# Patient Record
Sex: FEMALE | Race: ASIAN | NOT HISPANIC OR LATINO | URBAN - METROPOLITAN AREA
[De-identification: names, ages, dates, MRNs, and addresses within clinical notes are randomized per-mention and may not be internally consistent; named-entity substitution may affect disease eponyms.]

---

## 2019-12-21 ENCOUNTER — EMERGENCY (EMERGENCY)
Facility: HOSPITAL | Age: 54
LOS: 1 days | Discharge: HOME | End: 2019-12-21
Attending: STUDENT IN AN ORGANIZED HEALTH CARE EDUCATION/TRAINING PROGRAM
Payer: COMMERCIAL

## 2019-12-21 VITALS
TEMPERATURE: 98 F | RESPIRATION RATE: 16 BRPM | DIASTOLIC BLOOD PRESSURE: 89 MMHG | SYSTOLIC BLOOD PRESSURE: 138 MMHG | HEART RATE: 65 BPM | OXYGEN SATURATION: 100 %

## 2019-12-21 VITALS
RESPIRATION RATE: 16 BRPM | DIASTOLIC BLOOD PRESSURE: 105 MMHG | TEMPERATURE: 98 F | HEART RATE: 69 BPM | SYSTOLIC BLOOD PRESSURE: 161 MMHG | OXYGEN SATURATION: 99 %

## 2019-12-21 LAB
ALBUMIN SERPL ELPH-MCNC: 4.8 G/DL — SIGNIFICANT CHANGE UP (ref 3.3–5)
ALP SERPL-CCNC: 85 U/L — SIGNIFICANT CHANGE UP (ref 40–120)
ALT FLD-CCNC: 44 U/L — HIGH (ref 4–33)
ANION GAP SERPL CALC-SCNC: 15 MMO/L — HIGH (ref 7–14)
AST SERPL-CCNC: 29 U/L — SIGNIFICANT CHANGE UP (ref 4–32)
BILIRUB SERPL-MCNC: 0.8 MG/DL — SIGNIFICANT CHANGE UP (ref 0.2–1.2)
BUN SERPL-MCNC: 11 MG/DL — SIGNIFICANT CHANGE UP (ref 7–23)
CALCIUM SERPL-MCNC: 9.7 MG/DL — SIGNIFICANT CHANGE UP (ref 8.4–10.5)
CHLORIDE SERPL-SCNC: 101 MMOL/L — SIGNIFICANT CHANGE UP (ref 98–107)
CO2 SERPL-SCNC: 25 MMOL/L — SIGNIFICANT CHANGE UP (ref 22–31)
CREAT SERPL-MCNC: 0.75 MG/DL — SIGNIFICANT CHANGE UP (ref 0.5–1.3)
GLUCOSE SERPL-MCNC: 125 MG/DL — HIGH (ref 70–99)
HCT VFR BLD CALC: 45.3 % — HIGH (ref 34.5–45)
HGB BLD-MCNC: 15.1 G/DL — SIGNIFICANT CHANGE UP (ref 11.5–15.5)
MCHC RBC-ENTMCNC: 29.5 PG — SIGNIFICANT CHANGE UP (ref 27–34)
MCHC RBC-ENTMCNC: 33.3 % — SIGNIFICANT CHANGE UP (ref 32–36)
MCV RBC AUTO: 88.5 FL — SIGNIFICANT CHANGE UP (ref 80–100)
NRBC # FLD: 0 K/UL — SIGNIFICANT CHANGE UP (ref 0–0)
PLATELET # BLD AUTO: 326 K/UL — SIGNIFICANT CHANGE UP (ref 150–400)
PMV BLD: 9.1 FL — SIGNIFICANT CHANGE UP (ref 7–13)
POTASSIUM SERPL-MCNC: 3.8 MMOL/L — SIGNIFICANT CHANGE UP (ref 3.5–5.3)
POTASSIUM SERPL-SCNC: 3.8 MMOL/L — SIGNIFICANT CHANGE UP (ref 3.5–5.3)
PROT SERPL-MCNC: 8.3 G/DL — SIGNIFICANT CHANGE UP (ref 6–8.3)
RBC # BLD: 5.12 M/UL — SIGNIFICANT CHANGE UP (ref 3.8–5.2)
RBC # FLD: 12.5 % — SIGNIFICANT CHANGE UP (ref 10.3–14.5)
SODIUM SERPL-SCNC: 141 MMOL/L — SIGNIFICANT CHANGE UP (ref 135–145)
WBC # BLD: 8.38 K/UL — SIGNIFICANT CHANGE UP (ref 3.8–10.5)
WBC # FLD AUTO: 8.38 K/UL — SIGNIFICANT CHANGE UP (ref 3.8–10.5)

## 2019-12-21 PROCEDURE — 99284 EMERGENCY DEPT VISIT MOD MDM: CPT

## 2019-12-21 PROCEDURE — 71046 X-RAY EXAM CHEST 2 VIEWS: CPT | Mod: 26

## 2019-12-21 RX ORDER — ONDANSETRON 8 MG/1
1 TABLET, FILM COATED ORAL
Qty: 20 | Refills: 0
Start: 2019-12-21 | End: 2019-12-30

## 2019-12-21 RX ORDER — SODIUM CHLORIDE 9 MG/ML
1000 INJECTION INTRAMUSCULAR; INTRAVENOUS; SUBCUTANEOUS ONCE
Refills: 0 | Status: COMPLETED | OUTPATIENT
Start: 2019-12-21 | End: 2019-12-21

## 2019-12-21 RX ORDER — ONDANSETRON 8 MG/1
4 TABLET, FILM COATED ORAL ONCE
Refills: 0 | Status: COMPLETED | OUTPATIENT
Start: 2019-12-21 | End: 2019-12-21

## 2019-12-21 RX ADMIN — SODIUM CHLORIDE 1000 MILLILITER(S): 9 INJECTION INTRAMUSCULAR; INTRAVENOUS; SUBCUTANEOUS at 10:33

## 2019-12-21 RX ADMIN — ONDANSETRON 4 MILLIGRAM(S): 8 TABLET, FILM COATED ORAL at 10:33

## 2019-12-21 NOTE — ED ADULT TRIAGE NOTE - CHIEF COMPLAINT QUOTE
Patient states that she has had cold symptoms x 2 weeks. She is taking amoxicillin and med for her cough. c/o feeling dizzy , vomiting, no appetite.

## 2019-12-21 NOTE — ED PROVIDER NOTE - PATIENT PORTAL LINK FT
You can access the FollowMyHealth Patient Portal offered by [facility] by registering at the following website: [email or web address]. By joining [employer]’s FollowMyHealth portal, you will also be able to view your health information using other applications (apps) compatible with our system.

## 2019-12-21 NOTE — ED ADULT NURSE NOTE - OBJECTIVE STATEMENT
AOX4, skin warm, dry and intact. c/o cold like symptoms.  Denies any cough, body aches or runny nose.  Pt endorses nausea with vomiting.  IV access obtained labs, drawn and sent.  IVF in progress.  Pt medicated for nausea.

## 2019-12-21 NOTE — ED PROVIDER NOTE - PROGRESS NOTE DETAILS
Patient's labs, Imaging returned wnl, patient currently in no acute distress, will d/c home with PMD f/u.

## 2019-12-21 NOTE — ED PROVIDER NOTE - CPE EDP GASTRO NORM
Kaiden states the patient may have the Norovirus; he was vomiting Sunday and was fine on Monday. Today he has some leakage due to the diarrhea. Kaiden doesn't think she can get him in due to the leakage.  Please advise.  
Patient is still running temps 99.8, now is 97.4.  He is eating broth, crackers and juice. Still has beige watery stool, this started on Sunday.  No more vomiting or nausea at this time. They do not want to test the stool at this time. His stools are only every couple hours now.  He is going to eat very light and drink fluids. They are wondering if a probiotic might help , or any other ideas.  Message is given to Dr Tian.    
Patients wife is told not to take the probiotics they are to get a yogurt with good bacteria. Per Dr Tian.     
normal...

## 2019-12-21 NOTE — ED PROVIDER NOTE - OBJECTIVE STATEMENT
53 y/o female coming in with cough, nausea and intermittent NBNB emesis. Patient states she was prescribed Augmentin 4 days prior but has continued to have symptoms. Denies fever or chills.

## 2020-11-02 PROBLEM — Z78.9 OTHER SPECIFIED HEALTH STATUS: Chronic | Status: ACTIVE | Noted: 2019-12-21

## 2020-12-01 ENCOUNTER — APPOINTMENT (OUTPATIENT)
Dept: UROLOGY | Facility: CLINIC | Age: 55
End: 2020-12-01
Payer: COMMERCIAL

## 2020-12-01 VITALS
HEART RATE: 80 BPM | TEMPERATURE: 97.7 F | DIASTOLIC BLOOD PRESSURE: 88 MMHG | OXYGEN SATURATION: 97 % | WEIGHT: 114 LBS | HEIGHT: 59 IN | SYSTOLIC BLOOD PRESSURE: 135 MMHG | RESPIRATION RATE: 18 BRPM | BODY MASS INDEX: 22.98 KG/M2

## 2020-12-01 DIAGNOSIS — A15.9 RESPIRATORY TUBERCULOSIS UNSPECIFIED: ICD-10-CM

## 2020-12-01 DIAGNOSIS — R31.21 ASYMPTOMATIC MICROSCOPIC HEMATURIA: ICD-10-CM

## 2020-12-01 DIAGNOSIS — Z00.00 ENCOUNTER FOR GENERAL ADULT MEDICAL EXAMINATION W/OUT ABNORMAL FINDINGS: ICD-10-CM

## 2020-12-01 PROCEDURE — 76775 US EXAM ABDO BACK WALL LIM: CPT

## 2020-12-01 PROCEDURE — 52000 CYSTOURETHROSCOPY: CPT

## 2020-12-01 PROCEDURE — 99072 ADDL SUPL MATRL&STAF TM PHE: CPT

## 2020-12-01 PROCEDURE — 99204 OFFICE O/P NEW MOD 45 MIN: CPT | Mod: 25

## 2020-12-01 RX ORDER — RALOXIFENE HYDROCHLORIDE 60 MG/1
60 TABLET ORAL
Refills: 0 | Status: ACTIVE | COMMUNITY

## 2020-12-01 RX ORDER — CIPROFLOXACIN HYDROCHLORIDE 500 MG/1
500 TABLET, FILM COATED ORAL
Refills: 0 | Status: COMPLETED | OUTPATIENT
Start: 2020-12-01

## 2020-12-01 RX ORDER — PRAVASTATIN SODIUM 10 MG/1
10 TABLET ORAL
Refills: 0 | Status: ACTIVE | COMMUNITY

## 2020-12-01 RX ORDER — ISONIAZID 300 MG/1
300 TABLET ORAL; ORAL
Refills: 0 | Status: ACTIVE | COMMUNITY

## 2020-12-01 RX ORDER — CIPROFLOXACIN HYDROCHLORIDE 500 MG/1
500 TABLET, FILM COATED ORAL
Qty: 2 | Refills: 0 | Status: ACTIVE | COMMUNITY
Start: 2020-12-01

## 2020-12-01 NOTE — ADDENDUM
[FreeTextEntry1] : Entered by Margarito King, acting as scribe for Dr. Saul Magana.\par \par The documentation recorded by the scribe accurately reflects the service I personally performed and the decisions made by me.\par

## 2020-12-01 NOTE — LETTER BODY
[FreeTextEntry1] : Jackson Soto MD\par [address], [address]\[place] [address]\par [phone]\par \par Dear Dr. 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 Her urinalysis demonstrated evidence of microscopic hematuria, 3-10 RBC/HPF on microscopy. She denies any gross hematuria, dysuria or urinary incontinence. The patient denies any aggravating or relieving factors. The patient denies any interference of function. The patient is entirely asymptomatic. All other review of systems are negative. She has pancreatic cancer in her family medical history through her mother. She has had  section. Past medical history, family history and social history were inquired and were noncontributory to current condition. The patient does not use tobacco or drink alcohol. Medications and allergies were reviewed. She has no known allergies to medication. \par \par On examination, the patient is a healthy-appearing woman in no acute distress. She is alert and oriented follows commands. She  has normal mood and affect. She is normocephalic. Neck is supple. Oral no thrush. Respirations are unlabored. Abdomen is soft and nontender. Bladder is nonpalpable. No CVA tenderness. Neurologically she is grossly intact. No peripheral edema. Skin without gross abnormality.\par \par Her urinalysis demonstrated evidence of microscopic hematuria, 3-10 RBC/HPF on microscopy. \par \par Assessment: Microscopic hematuria.\par \par I counseled the patient on the various etiologies of the microscopic hematuria. I discussed the risk of occult malignancy. I counseled the patient about microscopic hematuria. I recommended patient obtain urinalysis, urine cytology, cystoscopy, and renal ultrasound. I counseled the patient about the procedures. I answered the patient's questions. The risks and expected outcomes were discussed. The patient will follow up as directed and contact me with any questions or concerns. Thank you for the opportunity to participate in the care of Ms. RUTLEDGE. I will keep you updated on her progress.\par \par Plan: Cystoscopy. Urinalysis. Urine cytology. Renal ultrasound. Follow up in 6 months.\par \par Her cystoscopy today was negative for hematuria.\par \par I personally reviewed ultrasound images with the patient today and images demonstrated both kidneys are normal in size and echogenicity without hydronephrosis, stones or solid masses present.

## 2020-12-05 LAB
APPEARANCE: ABNORMAL
BACTERIA: NEGATIVE
BILIRUBIN URINE: NEGATIVE
BLOOD URINE: NEGATIVE
CALCIUM OXALATE CRYSTALS: ABNORMAL
COLOR: YELLOW
GLUCOSE QUALITATIVE U: NEGATIVE
HYALINE CASTS: 1 /LPF
KETONES URINE: NEGATIVE
LEUKOCYTE ESTERASE URINE: NEGATIVE
MICROSCOPIC-UA: NORMAL
NITRITE URINE: NEGATIVE
PH URINE: 6
PROTEIN URINE: NORMAL
RED BLOOD CELLS URINE: 4 /HPF
SPECIFIC GRAVITY URINE: 1.03
SQUAMOUS EPITHELIAL CELLS: 2 /HPF
UROBILINOGEN URINE: NORMAL
WHITE BLOOD CELLS URINE: 3 /HPF

## 2020-12-06 LAB — URINE CYTOLOGY: NORMAL

## 2021-06-01 ENCOUNTER — APPOINTMENT (OUTPATIENT)
Dept: UROLOGY | Facility: CLINIC | Age: 56
End: 2021-06-01

## 2021-11-15 ENCOUNTER — INPATIENT (INPATIENT)
Facility: HOSPITAL | Age: 56
LOS: 1 days | Discharge: HOME | End: 2021-11-17
Attending: INTERNAL MEDICINE | Admitting: INTERNAL MEDICINE
Payer: COMMERCIAL

## 2021-11-15 VITALS
TEMPERATURE: 98 F | SYSTOLIC BLOOD PRESSURE: 121 MMHG | DIASTOLIC BLOOD PRESSURE: 88 MMHG | OXYGEN SATURATION: 100 % | RESPIRATION RATE: 18 BRPM | HEART RATE: 86 BPM

## 2021-11-15 DIAGNOSIS — R07.9 CHEST PAIN, UNSPECIFIED: ICD-10-CM

## 2021-11-15 LAB
ALBUMIN SERPL ELPH-MCNC: 4.6 G/DL — SIGNIFICANT CHANGE UP (ref 3.3–5)
ALP SERPL-CCNC: 80 U/L — SIGNIFICANT CHANGE UP (ref 40–120)
ALT FLD-CCNC: 29 U/L — SIGNIFICANT CHANGE UP (ref 4–33)
ANION GAP SERPL CALC-SCNC: 10 MMOL/L — SIGNIFICANT CHANGE UP (ref 7–14)
AST SERPL-CCNC: 20 U/L — SIGNIFICANT CHANGE UP (ref 4–32)
BASOPHILS # BLD AUTO: 0.04 K/UL — SIGNIFICANT CHANGE UP (ref 0–0.2)
BASOPHILS NFR BLD AUTO: 0.5 % — SIGNIFICANT CHANGE UP (ref 0–2)
BILIRUB SERPL-MCNC: 0.3 MG/DL — SIGNIFICANT CHANGE UP (ref 0.2–1.2)
BUN SERPL-MCNC: 14 MG/DL — SIGNIFICANT CHANGE UP (ref 7–23)
CALCIUM SERPL-MCNC: 9.5 MG/DL — SIGNIFICANT CHANGE UP (ref 8.4–10.5)
CHLORIDE SERPL-SCNC: 103 MMOL/L — SIGNIFICANT CHANGE UP (ref 98–107)
CO2 SERPL-SCNC: 29 MMOL/L — SIGNIFICANT CHANGE UP (ref 22–31)
CREAT SERPL-MCNC: 0.71 MG/DL — SIGNIFICANT CHANGE UP (ref 0.5–1.3)
EOSINOPHIL # BLD AUTO: 0.13 K/UL — SIGNIFICANT CHANGE UP (ref 0–0.5)
EOSINOPHIL NFR BLD AUTO: 1.5 % — SIGNIFICANT CHANGE UP (ref 0–6)
GLUCOSE SERPL-MCNC: 108 MG/DL — HIGH (ref 70–99)
HCT VFR BLD CALC: 40.4 % — SIGNIFICANT CHANGE UP (ref 34.5–45)
HGB BLD-MCNC: 13.6 G/DL — SIGNIFICANT CHANGE UP (ref 11.5–15.5)
IANC: 5.23 K/UL — SIGNIFICANT CHANGE UP (ref 1.5–8.5)
IMM GRANULOCYTES NFR BLD AUTO: 0.5 % — SIGNIFICANT CHANGE UP (ref 0–1.5)
LYMPHOCYTES # BLD AUTO: 2.36 K/UL — SIGNIFICANT CHANGE UP (ref 1–3.3)
LYMPHOCYTES # BLD AUTO: 27.9 % — SIGNIFICANT CHANGE UP (ref 13–44)
MCHC RBC-ENTMCNC: 30.3 PG — SIGNIFICANT CHANGE UP (ref 27–34)
MCHC RBC-ENTMCNC: 33.7 GM/DL — SIGNIFICANT CHANGE UP (ref 32–36)
MCV RBC AUTO: 90 FL — SIGNIFICANT CHANGE UP (ref 80–100)
MONOCYTES # BLD AUTO: 0.65 K/UL — SIGNIFICANT CHANGE UP (ref 0–0.9)
MONOCYTES NFR BLD AUTO: 7.7 % — SIGNIFICANT CHANGE UP (ref 2–14)
NEUTROPHILS # BLD AUTO: 5.23 K/UL — SIGNIFICANT CHANGE UP (ref 1.8–7.4)
NEUTROPHILS NFR BLD AUTO: 61.9 % — SIGNIFICANT CHANGE UP (ref 43–77)
NRBC # BLD: 0 /100 WBCS — SIGNIFICANT CHANGE UP
NRBC # FLD: 0 K/UL — SIGNIFICANT CHANGE UP
NT-PROBNP SERPL-SCNC: 48 PG/ML — SIGNIFICANT CHANGE UP
PLATELET # BLD AUTO: 316 K/UL — SIGNIFICANT CHANGE UP (ref 150–400)
POTASSIUM SERPL-MCNC: 3.8 MMOL/L — SIGNIFICANT CHANGE UP (ref 3.5–5.3)
POTASSIUM SERPL-SCNC: 3.8 MMOL/L — SIGNIFICANT CHANGE UP (ref 3.5–5.3)
PROT SERPL-MCNC: 7.8 G/DL — SIGNIFICANT CHANGE UP (ref 6–8.3)
RBC # BLD: 4.49 M/UL — SIGNIFICANT CHANGE UP (ref 3.8–5.2)
RBC # FLD: 12.4 % — SIGNIFICANT CHANGE UP (ref 10.3–14.5)
SODIUM SERPL-SCNC: 142 MMOL/L — SIGNIFICANT CHANGE UP (ref 135–145)
TROPONIN T, HIGH SENSITIVITY RESULT: <6 NG/L — SIGNIFICANT CHANGE UP
WBC # BLD: 8.45 K/UL — SIGNIFICANT CHANGE UP (ref 3.8–10.5)
WBC # FLD AUTO: 8.45 K/UL — SIGNIFICANT CHANGE UP (ref 3.8–10.5)

## 2021-11-15 PROCEDURE — 71046 X-RAY EXAM CHEST 2 VIEWS: CPT | Mod: 26

## 2021-11-15 PROCEDURE — 99285 EMERGENCY DEPT VISIT HI MDM: CPT

## 2021-11-15 RX ORDER — LANOLIN ALCOHOL/MO/W.PET/CERES
3 CREAM (GRAM) TOPICAL AT BEDTIME
Refills: 0 | Status: DISCONTINUED | OUTPATIENT
Start: 2021-11-15 | End: 2021-11-17

## 2021-11-15 RX ORDER — ASPIRIN/CALCIUM CARB/MAGNESIUM 324 MG
324 TABLET ORAL ONCE
Refills: 0 | Status: COMPLETED | OUTPATIENT
Start: 2021-11-15 | End: 2021-11-15

## 2021-11-15 RX ORDER — ACETAMINOPHEN 500 MG
650 TABLET ORAL EVERY 6 HOURS
Refills: 0 | Status: DISCONTINUED | OUTPATIENT
Start: 2021-11-15 | End: 2021-11-17

## 2021-11-15 RX ORDER — ONDANSETRON 8 MG/1
4 TABLET, FILM COATED ORAL EVERY 8 HOURS
Refills: 0 | Status: DISCONTINUED | OUTPATIENT
Start: 2021-11-15 | End: 2021-11-17

## 2021-11-15 RX ADMIN — Medication 324 MILLIGRAM(S): at 19:51

## 2021-11-15 NOTE — ED ADULT TRIAGE NOTE - CHIEF COMPLAINT QUOTE
pt c/o intermittent non radiating midsternal chest pain with palpitaitons x 5 days. denies any sob.   pmh-hld

## 2021-11-15 NOTE — ED PROVIDER NOTE - OBJECTIVE STATEMENT
56 year old fm with pmhx HLD presents to the ED with 5 days of episodic chest tightness, with associated SOB and palpitations. patient reports throughout the day for the last few days she has had episodic periods of feeling her heart racing that lasts for a few seconds with associated SOB and chest tightness. patient reports 15-20 episodes daily and occur unprovoked. patient reports contacting PCP which advised patient to go to ED for eval. patient reports mother and brother having cardiac events in early 50s. patient reports having an "abnormal EKG" in 2019 which she can not remember what the EKG was but reports having full cardiac evaluation by cardiologist including stress and echo which was unremarkable.

## 2021-11-15 NOTE — ED PROVIDER NOTE - ATTENDING CONTRIBUTION TO CARE
DR. RICHTER, ATTENDING MD-  I performed a face to face bedside interview with the patient regarding history of present illness, review of symptoms and past medical history. I completed an independent physical exam.  I have discussed the patient's plan of care with the PA.   Documentation as above in the note.    55 y/o female h/o hld pos fam h/o early cardiac disease here with cp x4 days described as "tightness" with int palpitations.  She called her pcp who advised she go to ED for eval.  Mod risk for acs.  Obtain ekg cbc bmp trop cxr covid swab give asa, admit vs obs for further cardiac care and evaluation.

## 2021-11-15 NOTE — ED PROVIDER NOTE - CLINICAL SUMMARY MEDICAL DECISION MAKING FREE TEXT BOX
56 year old fm with pmhx HLD presents to the ED with 5 days of episodic chest tightness, with associated SOB and palpitations. patient reports throughout the day for the last few days she has had episodic periods of feeling her heart racing that lasts for a few seconds with associated SOB and chest tightness.  will obtain BW, EKG, ASA, CXR pain management, echo/ stress? reassess.

## 2021-11-15 NOTE — ED PROVIDER NOTE - NS ED ROS FT
Review of Systems:  · Constitutional: no chills, no fever, no night sweats, no weight loss  · Nose: no epistaxis  · Mouth/Throat: no difficulty in swallowing, trachea midline, uvula midline  . Cardio: chest tightness, palpitations, no chest pressure, no ripping chest pain  · Respiratory: shortness of breath during episodes, no cough, no exertional dyspnea, no hemoptysis, no orthopnea,   · Gastrointestinal: no abdominal pain, no diarrhea, no melena, no nausea, no vomiting  · Genitourinary: no difficulty urinating, no dysuria, no hematuria  · MUSCULOSKELETAL: FROM of all extremities  · Skin: no abrasion; no bruising; no laceration  · Neurological: no change in level of consciousness, no headache, no seizures  · ROS STATEMENT: all other ROS negative except as per HPI

## 2021-11-16 DIAGNOSIS — Z29.9 ENCOUNTER FOR PROPHYLACTIC MEASURES, UNSPECIFIED: ICD-10-CM

## 2021-11-16 DIAGNOSIS — R07.9 CHEST PAIN, UNSPECIFIED: ICD-10-CM

## 2021-11-16 DIAGNOSIS — E78.5 HYPERLIPIDEMIA, UNSPECIFIED: ICD-10-CM

## 2021-11-16 LAB
ANION GAP SERPL CALC-SCNC: 9 MMOL/L — SIGNIFICANT CHANGE UP (ref 7–14)
BASOPHILS # BLD AUTO: 0.03 K/UL — SIGNIFICANT CHANGE UP (ref 0–0.2)
BASOPHILS NFR BLD AUTO: 0.4 % — SIGNIFICANT CHANGE UP (ref 0–2)
BUN SERPL-MCNC: 14 MG/DL — SIGNIFICANT CHANGE UP (ref 7–23)
CALCIUM SERPL-MCNC: 9.3 MG/DL — SIGNIFICANT CHANGE UP (ref 8.4–10.5)
CHLORIDE SERPL-SCNC: 105 MMOL/L — SIGNIFICANT CHANGE UP (ref 98–107)
CO2 SERPL-SCNC: 26 MMOL/L — SIGNIFICANT CHANGE UP (ref 22–31)
COVID-19 NUCLEOCAPSID GAM AB INTERP: NEGATIVE — SIGNIFICANT CHANGE UP
COVID-19 NUCLEOCAPSID TOTAL GAM ANTIBODY RESULT: 0.09 INDEX — SIGNIFICANT CHANGE UP
COVID-19 SPIKE DOMAIN AB INTERP: POSITIVE
COVID-19 SPIKE DOMAIN ANTIBODY RESULT: >250 U/ML — HIGH
CREAT SERPL-MCNC: 0.64 MG/DL — SIGNIFICANT CHANGE UP (ref 0.5–1.3)
EOSINOPHIL # BLD AUTO: 0.18 K/UL — SIGNIFICANT CHANGE UP (ref 0–0.5)
EOSINOPHIL NFR BLD AUTO: 2.6 % — SIGNIFICANT CHANGE UP (ref 0–6)
GLUCOSE SERPL-MCNC: 106 MG/DL — HIGH (ref 70–99)
HCT VFR BLD CALC: 41.1 % — SIGNIFICANT CHANGE UP (ref 34.5–45)
HGB BLD-MCNC: 13.3 G/DL — SIGNIFICANT CHANGE UP (ref 11.5–15.5)
IANC: 3.99 K/UL — SIGNIFICANT CHANGE UP (ref 1.5–8.5)
IMM GRANULOCYTES NFR BLD AUTO: 0.3 % — SIGNIFICANT CHANGE UP (ref 0–1.5)
LYMPHOCYTES # BLD AUTO: 2.15 K/UL — SIGNIFICANT CHANGE UP (ref 1–3.3)
LYMPHOCYTES # BLD AUTO: 31.2 % — SIGNIFICANT CHANGE UP (ref 13–44)
MCHC RBC-ENTMCNC: 29.3 PG — SIGNIFICANT CHANGE UP (ref 27–34)
MCHC RBC-ENTMCNC: 32.4 GM/DL — SIGNIFICANT CHANGE UP (ref 32–36)
MCV RBC AUTO: 90.5 FL — SIGNIFICANT CHANGE UP (ref 80–100)
MONOCYTES # BLD AUTO: 0.53 K/UL — SIGNIFICANT CHANGE UP (ref 0–0.9)
MONOCYTES NFR BLD AUTO: 7.7 % — SIGNIFICANT CHANGE UP (ref 2–14)
NEUTROPHILS # BLD AUTO: 3.99 K/UL — SIGNIFICANT CHANGE UP (ref 1.8–7.4)
NEUTROPHILS NFR BLD AUTO: 57.8 % — SIGNIFICANT CHANGE UP (ref 43–77)
NRBC # BLD: 0 /100 WBCS — SIGNIFICANT CHANGE UP
NRBC # FLD: 0 K/UL — SIGNIFICANT CHANGE UP
PLATELET # BLD AUTO: 277 K/UL — SIGNIFICANT CHANGE UP (ref 150–400)
POTASSIUM SERPL-MCNC: 3.7 MMOL/L — SIGNIFICANT CHANGE UP (ref 3.5–5.3)
POTASSIUM SERPL-SCNC: 3.7 MMOL/L — SIGNIFICANT CHANGE UP (ref 3.5–5.3)
RBC # BLD: 4.54 M/UL — SIGNIFICANT CHANGE UP (ref 3.8–5.2)
RBC # FLD: 12.5 % — SIGNIFICANT CHANGE UP (ref 10.3–14.5)
SARS-COV-2 IGG+IGM SERPL QL IA: 0.09 INDEX — SIGNIFICANT CHANGE UP
SARS-COV-2 IGG+IGM SERPL QL IA: >250 U/ML — HIGH
SARS-COV-2 IGG+IGM SERPL QL IA: NEGATIVE — SIGNIFICANT CHANGE UP
SARS-COV-2 IGG+IGM SERPL QL IA: POSITIVE
SARS-COV-2 RNA SPEC QL NAA+PROBE: SIGNIFICANT CHANGE UP
SODIUM SERPL-SCNC: 140 MMOL/L — SIGNIFICANT CHANGE UP (ref 135–145)
WBC # BLD: 6.9 K/UL — SIGNIFICANT CHANGE UP (ref 3.8–10.5)
WBC # FLD AUTO: 6.9 K/UL — SIGNIFICANT CHANGE UP (ref 3.8–10.5)

## 2021-11-16 PROCEDURE — 93306 TTE W/DOPPLER COMPLETE: CPT | Mod: 26

## 2021-11-16 PROCEDURE — 99222 1ST HOSP IP/OBS MODERATE 55: CPT

## 2021-11-16 RX ORDER — ATORVASTATIN CALCIUM 80 MG/1
10 TABLET, FILM COATED ORAL AT BEDTIME
Refills: 0 | Status: DISCONTINUED | OUTPATIENT
Start: 2021-11-16 | End: 2021-11-17

## 2021-11-16 RX ADMIN — ATORVASTATIN CALCIUM 10 MILLIGRAM(S): 80 TABLET, FILM COATED ORAL at 22:02

## 2021-11-16 NOTE — H&P ADULT - NSHPLABSRESULTS_GEN_ALL_CORE
13.6   8.45  )-----------( 316      ( 15 Nov 2021 20:06 )             40.4     Hgb Trend: 13.6<--  11-15    142  |  103  |  14  ----------------------------<  108<H>  3.8   |  29  |  0.71    Ca    9.5      15 Nov 2021 20:06    TPro  7.8  /  Alb  4.6  /  TBili  0.3  /  DBili  x   /  AST  20  /  ALT  29  /  AlkPhos  80  11-15    Creatinine Trend: 0.71<--          EKG is reviewed by me. It showed NSR     CXR as reviewed by the radiologist: Clear lungs     TTE is ordered.

## 2021-11-16 NOTE — PROGRESS NOTE ADULT - PROBLEM SELECTOR PLAN 1
Acute onset of worsening chest pain and palpitations with both rest and exertion   -EKG is WNL. trops negative x1  -HEART score is 2, suggestive low risk   echo   cards f/u

## 2021-11-16 NOTE — CONSULT NOTE ADULT - SUBJECTIVE AND OBJECTIVE BOX
Mario Villalobos MD  Interventional Cardiology / Endovascular Specialist  [place] Office : [address] [place] [address] [place] [address]  Tel:   [place] Office : [address] [place] [address]  Tel: [phone]  Cell : [phone]    HISTORY OF PRESENTING ILLNESS:  57 yo F with PMhx of HLD presents to the ED with worsening chest pain and palpitations. Patient started to develop these symptoms about 5 days ago. The pain is localized in the substernal area. It is intermittent, non-radiating, and non-reproducible on palpation. It worsens with physical exertion. It improves with rest. Initially, it happened with physical exertion but now it is more frequent and happens at rest. She also reports to have worsening palpitations. It feels as her heart is beating out of her chest. She denies any recent fever, chills, nausea, vomiting, abdominal pain, diarrhea, or LE edema. She never had stress test in the past. She denies any hx of smoking. She denies any recent travel. Denies cardiac history.   	  MEDICATIONS:        acetaminophen     Tablet .. 650 milliGRAM(s) Oral every 6 hours PRN  melatonin 3 milliGRAM(s) Oral at bedtime PRN  ondansetron Injectable 4 milliGRAM(s) IV Push every 8 hours PRN    aluminum hydroxide/magnesium hydroxide/simethicone Suspension 30 milliLiter(s) Oral every 4 hours PRN          PAST MEDICAL/SURGICAL HISTORY  PAST MEDICAL & SURGICAL HISTORY:  HLD (hyperlipidemia)    No significant past surgical history        SOCIAL HISTORY: Substance Use (street drugs): ( x ) never used  (  ) other:    FAMILY HISTORY:  FH: heart disease (Father, Sibling)        REVIEW OF SYSTEMS:  CONSTITUTIONAL: No fever, weight loss, or fatigue  EYES: No eye pain, visual disturbances, or discharge  ENMT:  No difficulty hearing, tinnitus, vertigo; No sinus or throat pain  BREASTS: No pain, masses, or nipple discharge  GASTROINTESTINAL: No abdominal or epigastric pain. No nausea, vomiting, or hematemesis; No diarrhea or constipation. No melena or hematochezia.  GENITOURINARY: No dysuria, frequency, hematuria, or incontinence  NEUROLOGICAL: No headaches, memory loss, loss of strength, numbness, or tremors  ENDOCRINE: No heat or cold intolerance; No hair loss  MUSCULOSKELETAL: No joint pain or swelling; No muscle, back, or extremity pain  PSYCHIATRIC: No depression, anxiety, mood swings, or difficulty sleeping  HEME/LYMPH: No easy bruising, or bleeding gums  All others negative    PHYSICAL EXAM:  T(C): 36.9 (11-16-21 @ 08:22), Max: 36.9 (11-16-21 @ 08:22)  HR: 65 (11-16-21 @ 08:22) (65 - 86)  BP: 137/82 (11-16-21 @ 08:22) (102/56 - 137/82)  RR: 18 (11-16-21 @ 08:22) (16 - 18)  SpO2: 97% (11-16-21 @ 08:22) (97% - 100%)  Wt(kg): --  I&O's Summary        GENERAL: NAD  EYES: EOMI, PERRLA, conjunctiva and sclera clear  ENMT: No tonsillar erythema, exudates, or enlargement  Cardiovascular: Normal S1 S2, No JVD, No murmurs, No edema  Respiratory: Lungs clear to auscultation	  Gastrointestinal:  Soft, Non-tender, + BS	  Extremities: No edema                                      13.3   6.90  )-----------( 277      ( 16 Nov 2021 07:00 )             41.1     11-16    140  |  105  |  14  ----------------------------<  106<H>  3.7   |  26  |  0.64    Ca    9.3      16 Nov 2021 07:00    TPro  7.8  /  Alb  4.6  /  TBili  0.3  /  DBili  x   /  AST  20  /  ALT  29  /  AlkPhos  80  11-15    proBNP: Serum Pro-Brain Natriuretic Peptide: 48 pg/mL (11-15 @ 20:06)    Lipid Profile:   HgA1c:   TSH:     Consultant(s) Notes Reviewed:  [x ] YES  [ ] NO    Care Discussed with Consultants/Other Providers [ x] YES  [ ] NO    Imaging Personally Reviewed independently:  [x] YES  [ ] NO    All labs, radiologic studies, vitals, orders and medications list reviewed. Patient is seen and examined at bedside. Case discussed with medical team.

## 2021-11-16 NOTE — CONSULT NOTE ADULT - ASSESSMENT
57 yo F with PMhx of HLD presents to the ED with worsening chest pain and palpitations.    EKG: NSR no acute changes    1. Chest pain  -on exertion  -trops negative   -EKG with no acute changes  -will get echo and stress      2. Palpitations  -currently NSR 60s on tele   -continue to monitor     3. DVT Prophylaxis  -early ambulation

## 2021-11-16 NOTE — H&P ADULT - ASSESSMENT
57 yo F with PMhx of HLD presents to the ED with worsening chest pain and palpitations, concerning for ACS.

## 2021-11-16 NOTE — CONSULT NOTE ADULT - ATTENDING COMMENTS

## 2021-11-16 NOTE — H&P ADULT - HISTORY OF PRESENT ILLNESS
55 yo F with PMhx of HLD presents to the ED with worsening chest pain and palpitations. Patient started to develop these symptoms about 5 days ago. The pain is localized in the substernal area. It is intermittent, non-radiating, and non-reproducible on palpation. It worsens with physical exertion. It improves with rest. Initially, it happened with physical exertion but now it is more frequent and happens at rest. She also reports to have worsening palpitations. It feels as her heart is beating out of her chest. She denies any recent fever, chills, nausea, vomiting, abdominal pain, diarrhea, or LE edema. She never had stress test in the past. She denies any hx of smoking. She denies any recent travel.   In the ED, her vitals were WNL. Labs were stale. EKG showed NSR.

## 2021-11-16 NOTE — H&P ADULT - NSHPREVIEWOFSYSTEMS_GEN_ALL_CORE
REVIEW OF SYSTEMS:    CONSTITUTIONAL: No weakness, fevers or chills  EYES/ENT: No visual changes;  No vertigo or throat pain   NECK: No pain or stiffness  RESPIRATORY: No cough, wheezing, hemoptysis; + shortness of breath  CARDIOVASCULAR: +chest pain, +palpitations.   GASTROINTESTINAL: No abdominal or epigastric pain. No nausea, vomiting, or hematemesis; No diarrhea or constipation. No melena or hematochezia.  GENITOURINARY: No dysuria, frequency or hematuria  NEUROLOGICAL: No numbness or weakness  MUSCULOSKELETAL: No joint pain, no muscle ache   SKIN: No itching, burning, rashes, or lesions   All other review of systems is negative unless indicated above.

## 2021-11-16 NOTE — H&P ADULT - PROBLEM SELECTOR PLAN 1
Acute onset of worsening chest pain and palpitations with both rest and exertion   -EKG is WNL. trops negative x1  -HEART score is 2, suggestive low risk   -TTE   -F/u with cardiology in the AM

## 2021-11-17 ENCOUNTER — TRANSCRIPTION ENCOUNTER (OUTPATIENT)
Age: 56
End: 2021-11-17

## 2021-11-17 VITALS
DIASTOLIC BLOOD PRESSURE: 84 MMHG | RESPIRATION RATE: 18 BRPM | HEART RATE: 76 BPM | OXYGEN SATURATION: 99 % | SYSTOLIC BLOOD PRESSURE: 138 MMHG | TEMPERATURE: 98 F

## 2021-11-17 LAB
ANION GAP SERPL CALC-SCNC: 9 MMOL/L — SIGNIFICANT CHANGE UP (ref 7–14)
BUN SERPL-MCNC: 16 MG/DL — SIGNIFICANT CHANGE UP (ref 7–23)
CALCIUM SERPL-MCNC: 9.4 MG/DL — SIGNIFICANT CHANGE UP (ref 8.4–10.5)
CHLORIDE SERPL-SCNC: 106 MMOL/L — SIGNIFICANT CHANGE UP (ref 98–107)
CO2 SERPL-SCNC: 24 MMOL/L — SIGNIFICANT CHANGE UP (ref 22–31)
CREAT SERPL-MCNC: 0.69 MG/DL — SIGNIFICANT CHANGE UP (ref 0.5–1.3)
GLUCOSE SERPL-MCNC: 110 MG/DL — HIGH (ref 70–99)
HCT VFR BLD CALC: 43 % — SIGNIFICANT CHANGE UP (ref 34.5–45)
HCV AB S/CO SERPL IA: 0.15 S/CO — SIGNIFICANT CHANGE UP (ref 0–0.99)
HCV AB SERPL-IMP: SIGNIFICANT CHANGE UP
HGB BLD-MCNC: 13.9 G/DL — SIGNIFICANT CHANGE UP (ref 11.5–15.5)
MAGNESIUM SERPL-MCNC: 2.1 MG/DL — SIGNIFICANT CHANGE UP (ref 1.6–2.6)
MCHC RBC-ENTMCNC: 28.9 PG — SIGNIFICANT CHANGE UP (ref 27–34)
MCHC RBC-ENTMCNC: 32.3 GM/DL — SIGNIFICANT CHANGE UP (ref 32–36)
MCV RBC AUTO: 89.4 FL — SIGNIFICANT CHANGE UP (ref 80–100)
NRBC # BLD: 0 /100 WBCS — SIGNIFICANT CHANGE UP
NRBC # FLD: 0 K/UL — SIGNIFICANT CHANGE UP
PHOSPHATE SERPL-MCNC: 4.1 MG/DL — SIGNIFICANT CHANGE UP (ref 2.5–4.5)
PLATELET # BLD AUTO: 277 K/UL — SIGNIFICANT CHANGE UP (ref 150–400)
POTASSIUM SERPL-MCNC: 3.9 MMOL/L — SIGNIFICANT CHANGE UP (ref 3.5–5.3)
POTASSIUM SERPL-SCNC: 3.9 MMOL/L — SIGNIFICANT CHANGE UP (ref 3.5–5.3)
RBC # BLD: 4.81 M/UL — SIGNIFICANT CHANGE UP (ref 3.8–5.2)
RBC # FLD: 12.2 % — SIGNIFICANT CHANGE UP (ref 10.3–14.5)
SODIUM SERPL-SCNC: 139 MMOL/L — SIGNIFICANT CHANGE UP (ref 135–145)
WBC # BLD: 6.92 K/UL — SIGNIFICANT CHANGE UP (ref 3.8–10.5)
WBC # FLD AUTO: 6.92 K/UL — SIGNIFICANT CHANGE UP (ref 3.8–10.5)

## 2021-11-17 PROCEDURE — 93016 CV STRESS TEST SUPVJ ONLY: CPT | Mod: GC

## 2021-11-17 PROCEDURE — 78452 HT MUSCLE IMAGE SPECT MULT: CPT | Mod: 26

## 2021-11-17 PROCEDURE — 93018 CV STRESS TEST I&R ONLY: CPT | Mod: GC

## 2021-11-17 NOTE — PROGRESS NOTE ADULT - ASSESSMENT
55 yo F with PMhx of HLD presents to the ED with worsening chest pain and palpitations.    EKG: NSR no acute changes    1. Chest pain  -on exertion  -trops negative   -EKG with no acute changes  -echo normal LV function no WMA  -f/u stress- neg      2. Palpitations  -currently NSR 60s on tele   -continue to monitor     3. DVT Prophylaxis  -early ambulation    discussed management pan with pt   all questions// concerns addressed   [facility] code [id number]
57 yo F with PMhx of HLD presents to the ED with worsening chest pain and palpitations.    EKG: NSR no acute changes    1. Chest pain  -on exertion  -trops negative   -EKG with no acute changes  -echo normal LV function no WMA  -f/u stress      2. Palpitations  -currently NSR 60s on tele   -continue to monitor     3. DVT Prophylaxis  -early ambulation
55 yo F with PMhx of HLD presents to the ED with worsening chest pain and palpitations, concerning for ACS.

## 2021-11-17 NOTE — PROGRESS NOTE ADULT - ATTENDING SUPERVISION STATEMENT
Anesthesia Evaluation     no history of anesthetic complications:  NPO Solid Status: > 8 hours  NPO Liquid Status: > 8 hours           Airway   Dental      Pulmonary - normal exam    breath sounds clear to auscultation  (-) not a smoker    ROS comment: Otitis media  Hearing loss  Cardiovascular     Rhythm: regular  Rate: normal    (-) valvular problems/murmurs      Neuro/Psych  (-) seizures  GI/Hepatic/Renal/Endo - negative ROS     Musculoskeletal (-) negative ROS    Abdominal    Substance History - negative use     OB/GYN          Other        ROS/Med Hx Other: Full term  Constipation issues as well                  Anesthesia Plan    ASA 2     general   (Discussed anes with mother,  and )  inhalational induction     Anesthetic plan, all risks, benefits, and alternatives have been provided, discussed and informed consent has been obtained with: legal guardian.      
ACP

## 2021-11-17 NOTE — DISCHARGE NOTE NURSING/CASE MANAGEMENT/SOCIAL WORK - PATIENT PORTAL LINK FT
You can access the FollowMyHealth Patient Portal offered by [facility] by registering at the following website: [email or web address]. By joining [employer]’s FollowMyHealth portal, you will also be able to view your health information using other applications (apps) compatible with our system.

## 2021-11-17 NOTE — DISCHARGE NOTE PROVIDER - CARE PROVIDER_API CALL
Mario Villalobos  CARDIOVASCULAR DISEASE  [address] [place] [address]  Phone: [phone]  Fax: [phone]  Follow Up Time:

## 2021-11-17 NOTE — DISCHARGE NOTE NURSING/CASE MANAGEMENT/SOCIAL WORK - NSDCPNINST_GEN_ALL_CORE
Follow up with MD as ordered. Take medications as prescribed. Follow heart healthy diet. 30 min physical activity recommended every day.

## 2021-11-17 NOTE — PROGRESS NOTE ADULT - SUBJECTIVE AND OBJECTIVE BOX
Mario Villalobos MD  Interventional Cardiology / Endovascular Specialist  [place] Office : [address] [place] [address]. [phone]  Tel:   [place] Office : [address] [facility] [place] [address]  Tel: [phone]  Cell : [phone]    Subjective/Overnight events: Patient lying in bed comfortably. No acute distress.   	  MEDICATIONS:        acetaminophen     Tablet .. 650 milliGRAM(s) Oral every 6 hours PRN  melatonin 3 milliGRAM(s) Oral at bedtime PRN  ondansetron Injectable 4 milliGRAM(s) IV Push every 8 hours PRN    aluminum hydroxide/magnesium hydroxide/simethicone Suspension 30 milliLiter(s) Oral every 4 hours PRN    atorvastatin 10 milliGRAM(s) Oral at bedtime        PAST MEDICAL/SURGICAL HISTORY  PAST MEDICAL & SURGICAL HISTORY:  HLD (hyperlipidemia)    No significant past surgical history        SOCIAL HISTORY: Substance Use (street drugs): ( x ) never used  (  ) other:    FAMILY HISTORY:  FH: heart disease (Father, Sibling)            PHYSICAL EXAM:  T(C): 36.6 (11-17-21 @ 08:55), Max: 36.9 (11-16-21 @ 20:06)  HR: 66 (11-17-21 @ 08:55) (66 - 80)  BP: 125/76 (11-17-21 @ 08:55) (114/86 - 125/76)  RR: 18 (11-17-21 @ 08:55) (17 - 18)  SpO2: 96% (11-17-21 @ 08:55) (96% - 99%)  Wt(kg): --  I&O's Summary    16 Nov 2021 07:01  -  17 Nov 2021 07:00  --------------------------------------------------------  IN: 180 mL / OUT: 0 mL / NET: 180 mL          GENERAL: NAD  EYES: EOMI, PERRLA, conjunctiva and sclera clear  ENMT: No tonsillar erythema, exudates, or enlargement  Cardiovascular: Normal S1 S2, No JVD, No murmurs, No edema  Respiratory: Lungs clear to auscultation	  Gastrointestinal:  Soft, Non-tender, + BS	  Extremities: No edema                                    13.9   6.92  )-----------( 277      ( 17 Nov 2021 07:23 )             43.0     11-17    139  |  106  |  16  ----------------------------<  110<H>  3.9   |  24  |  0.69    Ca    9.4      17 Nov 2021 07:23  Phos  4.1     11-17  Mg     2.10     11-17    TPro  7.8  /  Alb  4.6  /  TBili  0.3  /  DBili  x   /  AST  20  /  ALT  29  /  AlkPhos  80  11-15    proBNP:   Lipid Profile:   HgA1c:   TSH:     Consultant(s) Notes Reviewed:  [x ] YES  [ ] NO    Care Discussed with Consultants/Other Providers [ x] YES  [ ] NO    Imaging Personally Reviewed independently:  [x] YES  [ ] NO    All labs, radiologic studies, vitals, orders and medications list reviewed. Patient is seen and examined at bedside. Case discussed with medical team.              
  Subjective/Overnight events: Patient lying in bed comfortably. No acute distress.   	  MEDICATIONS:        acetaminophen     Tablet .. 650 milliGRAM(s) Oral every 6 hours PRN  melatonin 3 milliGRAM(s) Oral at bedtime PRN  ondansetron Injectable 4 milliGRAM(s) IV Push every 8 hours PRN    aluminum hydroxide/magnesium hydroxide/simethicone Suspension 30 milliLiter(s) Oral every 4 hours PRN    atorvastatin 10 milliGRAM(s) Oral at bedtime        PAST MEDICAL/SURGICAL HISTORY  PAST MEDICAL & SURGICAL HISTORY:  HLD (hyperlipidemia)    No significant past surgical history        SOCIAL HISTORY: Substance Use (street drugs): ( x ) never used  (  ) other:    FAMILY HISTORY:  FH: heart disease (Father, Sibling)            PHYSICAL EXAM:  T(C): 36.6 (11-17-21 @ 08:55), Max: 36.9 (11-16-21 @ 20:06)  HR: 66 (11-17-21 @ 08:55) (66 - 80)  BP: 125/76 (11-17-21 @ 08:55) (114/86 - 125/76)  RR: 18 (11-17-21 @ 08:55) (17 - 18)  SpO2: 96% (11-17-21 @ 08:55) (96% - 99%)  Wt(kg): --  I&O's Summary    16 Nov 2021 07:01  -  17 Nov 2021 07:00  --------------------------------------------------------  IN: 180 mL / OUT: 0 mL / NET: 180 mL          GENERAL: NAD  EYES: EOMI, PERRLA, conjunctiva and sclera clear  ENMT: No tonsillar erythema, exudates, or enlargement  Cardiovascular: Normal S1 S2, No JVD, No murmurs, No edema  Respiratory: Lungs clear to auscultation	  Gastrointestinal:  Soft, Non-tender, + BS	  Extremities: No edema                                    13.9   6.92  )-----------( 277      ( 17 Nov 2021 07:23 )             43.0     11-17    139  |  106  |  16  ----------------------------<  110<H>  3.9   |  24  |  0.69    Ca    9.4      17 Nov 2021 07:23  Phos  4.1     11-17  Mg     2.10     11-17    TPro  7.8  /  Alb  4.6  /  TBili  0.3  /  DBili  x   /  AST  20  /  ALT  29  /  AlkPhos  80  11-15    proBNP:   Lipid Profile:   HgA1c:   TSH:     Consultant(s) Notes Reviewed:  [x ] YES  [ ] NO    Care Discussed with Consultants/Other Providers [ x] YES  [ ] NO    Imaging Personally Reviewed independently:  [x] YES  [ ] NO    All labs, radiologic studies, vitals, orders and medications list reviewed. Patient is seen and examined at bedside. Case discussed with medical team.              
    SUBJECTIVE / OVERNIGHT EVENTS:pt seen and examined       MEDICATIONS  (STANDING):  atorvastatin 10 milliGRAM(s) Oral at bedtime    MEDICATIONS  (PRN):  acetaminophen     Tablet .. 650 milliGRAM(s) Oral every 6 hours PRN Temp greater or equal to 38C (100.4F), Mild Pain (1 - 3)  aluminum hydroxide/magnesium hydroxide/simethicone Suspension 30 milliLiter(s) Oral every 4 hours PRN Dyspepsia  melatonin 3 milliGRAM(s) Oral at bedtime PRN Insomnia  ondansetron Injectable 4 milliGRAM(s) IV Push every 8 hours PRN Nausea and/or Vomiting        CAPILLARY BLOOD GLUCOSE    Vital Signs Last 24 Hrs  T(C): 36.9 (11-16-21 @ 20:06), Max: 36.9 (11-16-21 @ 08:22)  T(F): 98.4 (11-16-21 @ 20:06), Max: 98.5 (11-16-21 @ 08:22)  HR: 80 (11-16-21 @ 20:06) (65 - 80)  BP: 118/71 (11-16-21 @ 20:06) (102/56 - 137/82)  BP(mean): 70 (11-16-21 @ 02:31) (70 - 70)  RR: 17 (11-16-21 @ 20:06) (16 - 18)  SpO2: 97% (11-16-21 @ 20:06) (97% - 100%)        I&O's Summary      Constitutional: No fever, fatigue  Skin: No rash.  Eyes: No recent vision problems or eye pain.  ENT: No congestion, ear pain, or sore throat.  Cardiovascular: No chest pain or palpation.  Respiratory: No cough, shortness of breath, congestion, or wheezing.  Gastrointestinal: No abdominal pain, nausea, vomiting, or diarrhea.  Genitourinary: No dysuria.  Musculoskeletal: No joint swelling.  Neurologic: No headache.    PHYSICAL EXAM:  GENERAL: NAD  EYES: EOMI, PERRLA  NECK: Supple, No JVD  CHEST/LUNG: cta jeronimo  HEART:  S1 , S2 +  ABDOMEN: soft , bs+  EXTREMITIES:  no edema  NEUROLOGY:alert awake      LABS:                        13.3   6.90  )-----------( 277      ( 16 Nov 2021 07:00 )             41.1     11-16    140  |  105  |  14  ----------------------------<  106<H>  3.7   |  26  |  0.64    Ca    9.3      16 Nov 2021 07:00    TPro  7.8  /  Alb  4.6  /  TBili  0.3  /  DBili  x   /  AST  20  /  ALT  29  /  AlkPhos  80  11-15              RADIOLOGY & ADDITIONAL TESTS:    Imaging Personally Reviewed:    Consultant(s) Notes Reviewed:      Care Discussed with Consultants/Other Providers:

## 2021-11-17 NOTE — DISCHARGE NOTE PROVIDER - HOSPITAL COURSE
57 yo F with PMhx of HLD presents to the ED with worsening chest pain and palpitations.    EKG: NSR no acute changes    Chest pain  -on exertion  -trops negative   -EKG with no acute changes  -echo normal LV function no WMA EF 68%  -Stress test- normal     stable for discharge home, d/w attending.

## 2022-11-14 NOTE — H&P ADULT - NSHPPHYSICALEXAM_GEN_ALL_CORE
No Vital Signs Last 24 Hrs  T(C): 36.7 (15 Nov 2021 17:03), Max: 36.7 (15 Nov 2021 17:03)  T(F): 98.1 (15 Nov 2021 17:03), Max: 98.1 (15 Nov 2021 17:03)  HR: 86 (15 Nov 2021 17:03) (86 - 86)  BP: 121/88 (15 Nov 2021 17:03) (121/88 - 121/88)  BP(mean): --  RR: 18 (15 Nov 2021 17:03) (18 - 18)  SpO2: 100% (15 Nov 2021 17:03) (100% - 100%)    GENERAL: NAD, well-developed  HEENT:  Atraumatic, Normocephalic, Conjunctiva and sclera clear, oral mucosa moist, clear w/o any exudate   NECK: Supple, No JVD  CHEST/LUNG: Breathing comfortably. Clear to auscultation bilaterally; No wheeze  HEART: Regular rate and rhythm; No murmurs, rubs, or gallops  ABDOMEN: Soft, Nontender, Nondistended; Bowel sounds present  EXTREMITIES:  2+ Peripheral Pulses, No clubbing, cyanosis, or edema  PSYCH: AAOx3, normal affect  NEUROLOGY: non-focal, moving all extremities.   SKIN: No rashes or lesions

## 2023-04-10 NOTE — ED PROVIDER NOTE - NSCAREINITIATED _GEN_ER
Initiate Treatment: doxycycline 100mg PO BID x 1 week,\\nmupirocin 2% ointment QD Detail Level: Zone Render In Strict Bullet Format?: No Imelda Benitez)

## 2024-06-04 NOTE — ED PROVIDER NOTE - ATTENDING WITH...
"RENAL PROGRESS NOTE - [facility]        CC:follow up TORIN/CKD 3b     Subjective:sleeping soundly today. Has been normotensive        ASSESSMENT:    79 yo male with h/o CKD 3b, lupus, HTN, PE, HFrEF admit with SOB, new diagnosis HFrEF and TORIN     PLAN:  TORIN/CKD 3b - baseline creat of 1.5, followed by Dr Akhtar, stable since 2013 with CKD attributed to prior lupus and HTN.  Now mild TORIN due to ct with contrast and hypotension after 5 kg diuresis here. Now back to baseline. Renal us with cortical thinning consistent with CKD. 0.7 gm proteinuria and no hematuria.  Nothing to suggest active lupus nephritis.  He does report some difficulty urinating  -agree with cautious resumption of  losartan with monitoring for recurrent hypotension   -monitor bladder scans with report of difficulty urinating, h/o prostate radiation, no e/o urinary retention thus far     2. HTN - bp has been labile, recently on low side  -trend, need to avoid hypotension  -resuming losartan today, watch for recurrent hypotension     3. HFrEF - new diagnosis, admit with sob due to pulm edema, has large infarct on nuclear stress test with ef 35%  -sob improved with 5 kg diuresis  -no plan for further ischemic workup now  -resume losartan   -could consider trial SGLT2i but would not start losartan and SGLT2i at same time given age, frailty, CKD etc  -was not on diuretic pta, bumex resumed at 2 mg today, will decrease bumex to 1 mg/day as clinically close to euvolemic, unclear he will need this much diuretic chronically     4. Lupus - on plaquenil  -as above, has not had e/o active lupus nephritis    Ashley Garg MD  [facility]  [phone]    Objective    PHYSICAL EXAM  /60 (BP Location: Left arm)   Pulse 85   Temp 98.4  F (36.9  C) (Oral)   Resp 18   Ht 1.778 m (5' 10\")   Wt 65.7 kg (144 lb 13.5 oz)   SpO2 97%   BMI 20.78 kg/m    I/O last 3 completed shifts:  In: 418 [P.O.:418]  Out: 420 [Urine:420]  Wt " Readings from Last 3 Encounters:   06/04/24 65.7 kg (144 lb 13.5 oz)   04/04/23 67.9 kg (149 lb 9.6 oz)   03/19/23 68.4 kg (150 lb 12.8 oz)       GENERAL: nad, frail  HEENT:normocephalic, atraumatic  CARDIOVASCULAR:   trace edema  PULMONARY:no resp distress No cyanosis  GASTROINTESTINAL: soft, nt/nd  MSK: diffuse muscle atrophy, warm  NEURO: asleep  SKIN: no jaundice, no rash.    LABORATORIES  Recent Labs   Lab 06/04/24  0438 06/03/24  0440 06/02/24  0439 06/01/24  0430 05/31/24  0456 05/30/24  1109    137 138 138 137 134*   POTASSIUM 3.9 4.4 3.6 3.5 3.6 3.9   CHLORIDE 103 103 100 102 101 103   CO2 24 22 25 25 24 20*   BUN 30.7* 31.9* 29.4* 26.2* 20.9 20.9   CR 1.57* 1.80* 1.75* 1.75* 1.69* 1.57*   GFRESTIMATED 44* 38* 39* 39* 41* 44*   STEVEN 9.6 9.4 9.5 9.2 9.3 8.7*   ALBUMIN  --  3.9  --  3.9 3.6  --        Recent Labs   Lab 06/04/24 0438 06/03/24 0440 06/02/24  0439 06/01/24  0430 05/31/24  0739 05/30/24  1109   WBC 7.4 8.7 5.6 5.9 8.4 7.8   HGB 10.7* 11.0* 10.3* 9.7* 10.7* 9.5*   HCT 33.1* 33.7* 31.1* 29.4* 33.0* 29.6*   MCV 98 98 98 98 98 97   * 113* 106* 98* 114* 113*          MEDICATIONS  Current Facility-Administered Medications   Medication Dose Route Frequency Provider Last Rate Last Admin    bisacodyl (DULCOLAX) suppository 10 mg  10 mg Rectal Daily Ned Arango MD        bumetanide (BUMEX) tablet 2 mg  2 mg Oral Daily Jose Clayton MD   2 mg at 06/04/24 0949    cyanocobalamin (VITAMIN B-12) tablet 1,000 mcg  1,000 mcg Oral Daily Ned Arango MD   1,000 mcg at 06/04/24 0949    hydroxychloroquine (PLAQUENIL) half-tab 100 mg  100 mg Oral QPM Ned Arango MD   100 mg at 06/03/24 2017    hydroxychloroquine (PLAQUENIL) tablet 200 mg  200 mg Oral Daily Ned Arango MD   200 mg at 06/04/24 0948    lamoTRIgine (LaMICtal) tablet 125 mg  125 mg Oral QAM Ned Arango MD   125 mg at 06/04/24 0949    lamoTRIgine (LaMICtal) tablet 200 mg  200 mg Oral QPM Ned Arango MD   200 mg  at 06/03/24 2017    losartan (COZAAR) half-tab 12.5 mg  12.5 mg Oral Daily Jose Clayton MD        metoprolol succinate ER (TOPROL XL) 24 hr tablet 25 mg  25 mg Oral Daily Ned Arango MD   25 mg at 06/04/24 0946    polyethylene glycol (MIRALAX) Packet 17 g  17 g Oral Daily Ned Arango MD   17 g at 06/04/24 0950    sodium chloride (PF) 0.9% PF flush 3 mL  3 mL Intracatheter Q8H Ned Arango MD   3 mL at 06/04/24 0212    spironolactone (ALDACTONE) half-tab 12.5 mg  12.5 mg Oral Daily Jose Clayton MD   12.5 mg at 06/04/24 0947    warfarin ANTICOAGULANT (COUMADIN) tablet 5 mg  5 mg Oral ONCE at 18:00 Ned Arango MD        Warfarin Dose Required Daily - Pharmacist Managed  1 each Oral See Admin Instructions Ned Arango MD Alexandra Straight, MD  [facility]  [phone]        ACP

## 2024-12-24 PROBLEM — F10.90 ALCOHOL USE: Status: INACTIVE | Noted: 2020-12-01

## 2025-07-10 ENCOUNTER — EMERGENCY (EMERGENCY)
Facility: HOSPITAL | Age: 60
LOS: 1 days | End: 2025-07-10
Attending: EMERGENCY MEDICINE | Admitting: EMERGENCY MEDICINE
Payer: COMMERCIAL

## 2025-07-10 VITALS
OXYGEN SATURATION: 98 % | DIASTOLIC BLOOD PRESSURE: 106 MMHG | HEART RATE: 69 BPM | RESPIRATION RATE: 17 BRPM | SYSTOLIC BLOOD PRESSURE: 182 MMHG | WEIGHT: 121.92 LBS | TEMPERATURE: 98 F

## 2025-07-10 PROCEDURE — 99291 CRITICAL CARE FIRST HOUR: CPT

## 2025-07-10 PROCEDURE — 93010 ELECTROCARDIOGRAM REPORT: CPT

## 2025-07-10 NOTE — ED PROVIDER NOTE - RAPID ASSESSMENT
Rapid assessment: Patient is presenting with dizziness, nausea, vomiting started at 5:30 PM today.  Denies room spinning sensation.  Patient has cranial nerves III through XII intact.  Equal strength and sensation in both upper and lower extremities.  Smooth gait.  Smooth finger-to-nose exam.  Smooth toe and heel walking.  No concern for stroke at this time given the clinical exam.

## 2025-07-10 NOTE — ED PROVIDER NOTE - NSFOLLOWUPINSTRUCTIONS_ED_ALL_ED_FT
Dizziness    Dizziness can manifest as a feeling of unsteadiness or light-headedness. You may feel like you are about to faint. This condition can be caused by a number of things, including medicines, dehydration, or illness. Drink enough fluid to keep your urine clear or pale yellow. Do not drink alcohol and limit your caffeine intake. Avoid quick or sudden movements.  Rise slowly from chairs and steady yourself until you feel okay. In the morning, first sit up on the side of the bed.    We sent two prescriptions to your pharmacy for medications to help with  nausea (ZOFRAN) and dizziness (MECLIZINE).     Please follow up with your primary care doctor in the next 2-3 days.     SEEK IMMEDIATE MEDICAL CARE IF YOU HAVE ANY OF THE FOLLOWING SYMPTOMS: vomiting, changes in your vision or speech, weakness in your arms or legs, trouble speaking or swallowing, chest pain, abdominal pain, shortness of breath, sweating, bleeding, headache, neck pain, or fever.

## 2025-07-10 NOTE — ED PROVIDER NOTE - CLINICAL SUMMARY MEDICAL DECISION MAKING FREE TEXT BOX
59-year-old female with past medical history of hypertension and hyperlipidemia presents emergency department for evaluation of dizziness onset 5:30 PM this evening. Associated headache, nausea and vomiting.  Describes dizziness as heaviness in the head, not room spinning. Patient with clear speech and no facial droop, physical exam notable for bilateral horizontal nystagmus on lateral gaze, decreased strength in left upper extremity and left lower extremity compared to right.  Subjective sensory deficit to left lateral upper extremity.  Code stroke activated as patient is still within a 24-hour window for thrombectomy if LVO is present.  Neurology at bedside.  Will medicate for dizziness and headache.  Dispo pending workup.

## 2025-07-10 NOTE — ED PROVIDER NOTE - OBJECTIVE STATEMENT
59-year-old female with past medical history of hypertension and hyperlipidemia presents emergency department for evaluation of dizziness onset 5:30 PM this evening. Associated headache, nausea and vomiting.  Describes dizziness as heaviness in the head, not room spinning.  Also endorsing subjective left-sided weakness worse in the left upper extremity.  Has never had symptoms like this before, no history of vertigo.  Denies fevers or chills, chest pain, vision change, difficulty walking.  Symptoms are better when she closes her eyes. Not on AC. No recent head trauma.

## 2025-07-10 NOTE — ED PROVIDER NOTE - PATIENT PORTAL LINK FT
You can access the FollowMyHealth Patient Portal offered by [facility] by registering at the following website: [email or web address]. By joining [employer]’s FollowMyHealth portal, you will also be able to view your health information using other applications (apps) compatible with our system.

## 2025-07-10 NOTE — ED PROVIDER NOTE - ATTENDING CONTRIBUTION TO CARE
59-year-old female past medical history of hypertension hyperlipidemia presenting was feeling unwell.  Patient denies room spinning however does have overt nystagmus.  States that when she was on the train at 5 PM began to feel unwell states that she was diaphoretic and had some generalized weakness and " heaviness" feeling.  Per son at bedside she was complaining of weakness worse on the left physical exam 4 out of 5 strength of the left upper and left lower extremity unclear if poor effort vs true weakness with subjective sensory deficit as well due to this concern code stroke was activated.  DDx includes CVA peripheral vertigo, viral illness due to age and comorbidities we will also obtain troponin.  Provide supportive care and reassessment.

## 2025-07-10 NOTE — ED PROVIDER NOTE - PHYSICAL EXAMINATION
Gen: well appearing, in no acute distress   Head: normal appearing  HEENT: normal conjunctiva, oral mucosa moist, vision grossly intact, PERRL, horizontal nystagmus TORIE   Lung: no respiratory distress, speaking in full sentences, CTA b/l, no wheeze, crackles or rhonchi   CV: regular rate and rhythm, no murmurs  Abd: soft, non distended, non tender   MSK: no visible deformities, ambulating without difficulty   Neuro: AAOx3, decreased strength in LUE compared to RUE, diminished strength in LLE, normal plantar/dorsiflexion TORIE, diminished sensation over the lateral L distal upper extremity, no facial droop or speech changes   Skin: Warm, no rashes   Psych: normal affect

## 2025-07-10 NOTE — ED PROVIDER NOTE - PROGRESS NOTE DETAILS
Kate PGY-3; Patient symptomatically improved.  Able to tolerate p.o., ambulated to bathroom x 2 without assistance and with steady gait.  CT imaging and lab work unremarkable.  Cleared by neurology for outpatient follow-up with PCP.  Patient and family updated on plan.  Will send meclizine and Zofran to pharmacy.  Return precautions discussed

## 2025-07-10 NOTE — ED ADULT TRIAGE NOTE - CHIEF COMPLAINT QUOTE
pt c/o dizziness and nausea w/o vomiting beginning around 530pm today. facial features symmetric, denies blurry vision, ambulatory with steady gait, 5/5 upper and lower extremity strength noted. denies chest pain, vomiting, fevers, chills. pt did not take BP meds today    MD Medrano evaluated patient, no code stroke activated   history of HTN, HLD

## 2025-07-11 VITALS
RESPIRATION RATE: 17 BRPM | DIASTOLIC BLOOD PRESSURE: 95 MMHG | TEMPERATURE: 98 F | HEART RATE: 78 BPM | SYSTOLIC BLOOD PRESSURE: 145 MMHG | OXYGEN SATURATION: 98 %

## 2025-07-11 PROBLEM — E78.5 HYPERLIPIDEMIA, UNSPECIFIED: Chronic | Status: ACTIVE | Noted: 2021-11-15

## 2025-07-11 LAB
ALBUMIN SERPL ELPH-MCNC: 4.8 G/DL — SIGNIFICANT CHANGE UP (ref 3.3–5)
ALP SERPL-CCNC: 103 U/L — SIGNIFICANT CHANGE UP (ref 40–120)
ALT FLD-CCNC: 61 U/L — HIGH (ref 4–33)
ANION GAP SERPL CALC-SCNC: 14 MMOL/L — SIGNIFICANT CHANGE UP (ref 7–14)
APPEARANCE UR: CLEAR — SIGNIFICANT CHANGE UP
APTT BLD: 31.7 SEC — SIGNIFICANT CHANGE UP (ref 26.1–36.8)
AST SERPL-CCNC: 36 U/L — HIGH (ref 4–32)
BASOPHILS # BLD AUTO: 0.03 K/UL — SIGNIFICANT CHANGE UP (ref 0–0.2)
BASOPHILS NFR BLD AUTO: 0.2 % — SIGNIFICANT CHANGE UP (ref 0–2)
BILIRUB SERPL-MCNC: 0.5 MG/DL — SIGNIFICANT CHANGE UP (ref 0.2–1.2)
BILIRUB UR-MCNC: NEGATIVE — SIGNIFICANT CHANGE UP
BLD GP AB SCN SERPL QL: NEGATIVE — SIGNIFICANT CHANGE UP
BUN SERPL-MCNC: 15 MG/DL — SIGNIFICANT CHANGE UP (ref 7–23)
CALCIUM SERPL-MCNC: 9.9 MG/DL — SIGNIFICANT CHANGE UP (ref 8.4–10.5)
CHLORIDE SERPL-SCNC: 105 MMOL/L — SIGNIFICANT CHANGE UP (ref 98–107)
CK MB BLD-MCNC: 1.4 % — SIGNIFICANT CHANGE UP (ref 0–2.5)
CK MB CFR SERPL CALC: 1.3 NG/ML — SIGNIFICANT CHANGE UP
CK SERPL-CCNC: 93 U/L — SIGNIFICANT CHANGE UP (ref 25–170)
CO2 SERPL-SCNC: 23 MMOL/L — SIGNIFICANT CHANGE UP (ref 22–31)
COLOR SPEC: YELLOW — SIGNIFICANT CHANGE UP
CREAT SERPL-MCNC: 0.59 MG/DL — SIGNIFICANT CHANGE UP (ref 0.5–1.3)
DIFF PNL FLD: NEGATIVE — SIGNIFICANT CHANGE UP
EGFR: 104 ML/MIN/1.73M2 — SIGNIFICANT CHANGE UP
EGFR: 104 ML/MIN/1.73M2 — SIGNIFICANT CHANGE UP
EOSINOPHIL # BLD AUTO: 0.02 K/UL — SIGNIFICANT CHANGE UP (ref 0–0.5)
EOSINOPHIL NFR BLD AUTO: 0.2 % — SIGNIFICANT CHANGE UP (ref 0–6)
FLUAV AG NPH QL: SIGNIFICANT CHANGE UP
FLUBV AG NPH QL: SIGNIFICANT CHANGE UP
GAS PNL BLDV: SIGNIFICANT CHANGE UP
GLUCOSE SERPL-MCNC: 189 MG/DL — HIGH (ref 70–99)
GLUCOSE UR QL: 100 MG/DL
HCT VFR BLD CALC: 42.5 % — SIGNIFICANT CHANGE UP (ref 34.5–45)
HGB BLD-MCNC: 14.3 G/DL — SIGNIFICANT CHANGE UP (ref 11.5–15.5)
IMM GRANULOCYTES # BLD AUTO: 0.08 K/UL — HIGH (ref 0–0.07)
IMM GRANULOCYTES NFR BLD AUTO: 0.6 % — SIGNIFICANT CHANGE UP (ref 0–0.9)
INR BLD: 0.9 RATIO — SIGNIFICANT CHANGE UP (ref 0.85–1.16)
KETONES UR QL: NEGATIVE MG/DL — SIGNIFICANT CHANGE UP
LEUKOCYTE ESTERASE UR-ACNC: NEGATIVE — SIGNIFICANT CHANGE UP
LYMPHOCYTES # BLD AUTO: 1.3 K/UL — SIGNIFICANT CHANGE UP (ref 1–3.3)
LYMPHOCYTES NFR BLD AUTO: 10.4 % — LOW (ref 13–44)
MCHC RBC-ENTMCNC: 29.4 PG — SIGNIFICANT CHANGE UP (ref 27–34)
MCHC RBC-ENTMCNC: 33.6 G/DL — SIGNIFICANT CHANGE UP (ref 32–36)
MCV RBC AUTO: 87.4 FL — SIGNIFICANT CHANGE UP (ref 80–100)
MONOCYTES # BLD AUTO: 0.31 K/UL — SIGNIFICANT CHANGE UP (ref 0–0.9)
MONOCYTES NFR BLD AUTO: 2.5 % — SIGNIFICANT CHANGE UP (ref 2–14)
NEUTROPHILS # BLD AUTO: 10.75 K/UL — HIGH (ref 1.8–7.4)
NEUTROPHILS NFR BLD AUTO: 86.1 % — HIGH (ref 43–77)
NITRITE UR-MCNC: NEGATIVE — SIGNIFICANT CHANGE UP
NRBC # BLD AUTO: 0 K/UL — SIGNIFICANT CHANGE UP (ref 0–0)
NRBC # FLD: 0 K/UL — SIGNIFICANT CHANGE UP (ref 0–0)
NRBC BLD AUTO-RTO: 0 /100 WBCS — SIGNIFICANT CHANGE UP (ref 0–0)
PH UR: 8.5 (ref 5–8)
PLATELET # BLD AUTO: 326 K/UL — SIGNIFICANT CHANGE UP (ref 150–400)
PMV BLD: 9.2 FL — SIGNIFICANT CHANGE UP (ref 7–13)
POTASSIUM SERPL-MCNC: 3.9 MMOL/L — SIGNIFICANT CHANGE UP (ref 3.5–5.3)
POTASSIUM SERPL-SCNC: 3.9 MMOL/L — SIGNIFICANT CHANGE UP (ref 3.5–5.3)
PROT SERPL-MCNC: 8.1 G/DL — SIGNIFICANT CHANGE UP (ref 6–8.3)
PROT UR-MCNC: SIGNIFICANT CHANGE UP MG/DL
PROTHROM AB SERPL-ACNC: 10.5 SEC — SIGNIFICANT CHANGE UP (ref 9.9–13.4)
RBC # BLD: 4.86 M/UL — SIGNIFICANT CHANGE UP (ref 3.8–5.2)
RBC # FLD: 12.3 % — SIGNIFICANT CHANGE UP (ref 10.3–14.5)
RH IG SCN BLD-IMP: POSITIVE — SIGNIFICANT CHANGE UP
RSV RNA NPH QL NAA+NON-PROBE: SIGNIFICANT CHANGE UP
SARS-COV-2 RNA SPEC QL NAA+PROBE: SIGNIFICANT CHANGE UP
SODIUM SERPL-SCNC: 142 MMOL/L — SIGNIFICANT CHANGE UP (ref 135–145)
SOURCE RESPIRATORY: SIGNIFICANT CHANGE UP
SP GR SPEC: 1.05 — HIGH (ref 1–1.03)
TROPONIN T, HIGH SENSITIVITY RESULT: <6 NG/L — SIGNIFICANT CHANGE UP
UROBILINOGEN FLD QL: 0.2 MG/DL — SIGNIFICANT CHANGE UP (ref 0.2–1)
WBC # BLD: 12.49 K/UL — HIGH (ref 3.8–10.5)
WBC # FLD AUTO: 12.49 K/UL — HIGH (ref 3.8–10.5)

## 2025-07-11 PROCEDURE — 71045 X-RAY EXAM CHEST 1 VIEW: CPT | Mod: 26

## 2025-07-11 PROCEDURE — 0042T: CPT

## 2025-07-11 PROCEDURE — 70450 CT HEAD/BRAIN W/O DYE: CPT | Mod: 26,59

## 2025-07-11 PROCEDURE — 70496 CT ANGIOGRAPHY HEAD: CPT | Mod: 26

## 2025-07-11 PROCEDURE — 70498 CT ANGIOGRAPHY NECK: CPT | Mod: 26

## 2025-07-11 RX ORDER — MECLIZINE HCL 12.5 MG
1 TABLET ORAL
Qty: 10 | Refills: 0
Start: 2025-07-11 | End: 2025-07-15

## 2025-07-11 RX ORDER — ACETAMINOPHEN 500 MG/5ML
1000 LIQUID (ML) ORAL ONCE
Refills: 0 | Status: COMPLETED | OUTPATIENT
Start: 2025-07-11 | End: 2025-07-11

## 2025-07-11 RX ORDER — SODIUM CHLORIDE 9 G/1000ML
1000 INJECTION, SOLUTION INTRAVENOUS ONCE
Refills: 0 | Status: COMPLETED | OUTPATIENT
Start: 2025-07-11 | End: 2025-07-11

## 2025-07-11 RX ORDER — ONDANSETRON HCL/PF 4 MG/2 ML
1 VIAL (ML) INJECTION
Qty: 1 | Refills: 0
Start: 2025-07-11 | End: 2025-07-13

## 2025-07-11 RX ORDER — METOCLOPRAMIDE HCL 10 MG
10 TABLET ORAL ONCE
Refills: 0 | Status: COMPLETED | OUTPATIENT
Start: 2025-07-11 | End: 2025-07-11

## 2025-07-11 RX ADMIN — Medication 10 MILLIGRAM(S): at 01:02

## 2025-07-11 RX ADMIN — SODIUM CHLORIDE 1000 MILLILITER(S): 9 INJECTION, SOLUTION INTRAVENOUS at 01:27

## 2025-07-11 RX ADMIN — Medication 400 MILLIGRAM(S): at 01:02

## 2025-07-11 NOTE — ED ADULT NURSE NOTE - OBJECTIVE STATEMENT
Cristal RN: received pt as a code stroke, AOx4, in no acute distress, on stretcher w/ son at bedside. Pt presented to the ED c/o dizziness associated w/ n/v worse with movement that started at 5:30PM today. 1 episode of vomiting here in the ED, reglan administered as per MAR. Neuro wnl, PERRLA, strong  b/l, no facial droop noted. Pt also endorses having a fever, congestion and sore throat this week which all resolved w/ relief of OTC tylenol. Denies CP, SOB, lightheadedness, body aches, chills, room-spinning sensation, blurry vision, slurred speech, abd. pain, back pian, urinary sx. CTH completed STAT to r/o stroke. 20g IV to LAC, + blood return. Pt placed on continuous cardiac/BP/O2 sat monitoring, NSR on the monitor. Resp. equal and unlabored, EKG completed. Safety precautions maintained. Neurologist at bedside evaluating pt.

## 2025-07-11 NOTE — STROKE CODE NOTE - MRS SCORE
[facility] Stay Note:  Arrived - 1040  Assessment - unchanged. Visit Vitals  /88 (BP 1 Location: Left arm, BP Patient Position: Sitting)   Pulse (!) 106   Temp 99 °F (37.2 °C)   Resp 18     Udenyca SQ slowly in L arm. 1045 - Tolerated well. No reaction noted. Pt denies any acute problems/changes. Discharged from [facility] ambulatory. No distress.  Next appt: 2/19/20 at 0800
(1) No significant disability. Able to carry out all usual activities, despite some symptoms.

## 2025-07-11 NOTE — CONSULT NOTE ADULT - SUBJECTIVE AND OBJECTIVE BOX
**STROKE CONSULT NOTE**    Last known well time/Time of onset of symptoms: 7/10 1730    HPI: 58 yo RHF w/ HTN, HLD reports to the hospital  dizziness onset . Neurology consulted for vertigo. Per son, patient was recently recovering for an illness that her son had, with generalized weakness, sore throat, runny nose. Patient was not feeling well all day today, unable to tolerate PO intake and Tylenol, and home HTN meds for the past 2 days. Patient states she had tension headache, with dizziness, worsened with sitting and walking, better when lying down but still present lying down.   Per ED, patient had reported dizziness with left arm weakness but did not report this to me. No focal weakness/numbness, no vision loss, no speech changes.   On arrival to the ED, temp 97.7, HR 69, /106, RR 17, O2 98 on RA. WBC 12.5, Hbg 14.3, Plt 326, Na 142, K 3.9, Cr 0.59, Trop <6.   NDKA, no tobacco use, no daily/binge alcohol use or recreational drug use such as cocaine, heroin or marijuana.     SOCIAL HISTORY:    PAST MEDICAL & SURGICAL HISTORY:  HLD (hyperlipidemia)      No significant past surgical history          FAMILY HISTORY:  FH: heart disease (Father, Sibling)        ROS:  Constitutional: +fever, chills  Eyes: No visual disturbances  ENMT:  ? vertigo  Neck: No pain or stiffness  Respiratory: No cough, wheezing  Cardiovascular: No chest pain, palpitations, shortness of breath  Gastrointestinal: No abdominal pain. + nausea, vomiting, No diarrhea or constipation.  Genitourinary: No dysuria, frequency  Neurological: As per HPI    MEDICATIONS  (STANDING):    MEDICATIONS  (PRN):      Allergies    No Known Allergies    Intolerances        Vital Signs Last 24 Hrs  T(C): 36.4 (2025 01:55), Max: 36.5 (10 Jul 2025 23:21)  T(F): 97.6 (2025 01:55), Max: 97.7 (10 Jul 2025 23:21)  HR: 77 (2025 01:55) (69 - 77)  BP: 152/94 (2025 01:55) (152/94 - 182/106)  BP(mean): --  RR: 16 (2025 01:55) (16 - 17)  SpO2: 97% (:55) (97% - 98%)    Parameters below as of 2025 01:55  Patient On (Oxygen Delivery Method): room air        PHYSICAL EXAM:  Constitutional: WDWN; NAD  Respiratory: Normal respiratory effort,   Neurologic:  Mental status: Awake, alert and oriented to name, location, month and age.  Recent and remote memory intact.  Naming, repetition and comprehension intact. No dysarthria.     Cranial nerves: Pupils equally round and reactive to light, visual fields full, no nystagmus, extraocular muscles intact, V1 through V3 intact bilaterally and symmetric, face symmetric, palate elevation symmetric, tongue was midline, sternocleidomastoid/shoulder shrug strength bilaterally 5/5.    Motor:  Normal bulk and tone, strength 4+/5 in bilateral upper and lower extremities (effort dependant).   strength 4+/5 (effort depednant).  Rapid alternating movements intact and symmetric.   Sensation: Intact to light touch.  No neglect.   Coordination: No dysmetria on finger-to-nose and toe to finger b/l.  No clumsiness.  Reflexes: Deferred in acute setting  Gait: Slow but steady, able to walk unassisted. No ataxia.     NIHSS: 0    Fingerstick Blood Glucose: CAPILLARY BLOOD GLUCOSE  173 (2025 01:00)      POCT Blood Glucose.: 173 mg/dL (10 Jul 2025 23:26)       LABS:                        14.3   12.49 )-----------( 326      ( 2025 00:56 )             42.5     07-11    142  |  105  |  15  ----------------------------<  189[H]  3.9   |  23  |  0.59    Ca    9.9      2025 00:56    TPro  8.1  /  Alb  4.8  /  TBili  0.5  /  DBili  x   /  AST  36[H]  /  ALT  61[H]  /  AlkPhos  103  07-11    PT/INR - ( 2025 00:56 )   PT: 10.5 sec;   INR: 0.90 ratio         PTT - ( 2025 00:56 )  PTT:31.7 sec  CARDIAC MARKERS ( 2025 00:56 )  x     / x     / x     / x     / 1.3 ng/mL      Urinalysis Basic - ( 2025 03:37 )    Color: Yellow / Appearance: Clear / S.052 / pH: x  Gluc: x / Ketone: x  / Bili: Negative / Urobili: 0.2 mg/dL   Blood: x / Protein: Trace mg/dL / Nitrite: Negative   Leuk Esterase: Negative / RBC: x / WBC x   Sq Epi: x / Non Sq Epi: x / Bacteria: x        RADIOLOGY & ADDITIONAL STUDIES:  CTH/CTA/CTP 25  IMPRESSION:  No acute intracranial hemorrhage, mass effect, or CT evidence of an acute vascular territorial infarct.  IMPRESSION:  CT PERFUSION:  Technical limitations: None.  Core infarction: 0 ml  Penumbra / tissue at risk for active ischemia: 0 ml  CTA NECK:  No evidence of significant stenosis or occlusion.  CTA HEAD:  No large vessel occlusion, significant stenosis or vascular abnormality   identified.

## 2025-07-11 NOTE — CONSULT NOTE ADULT - ASSESSMENT
58 yo RHF w/ HTN, HLD reports to the hospital 2/2 dizziness onset 1730. Neurology consulted for vertigo. Patient recently recovering from a viral infection. Patient reports dizziness described as head heaviness. Reports no focal weakness/numbness. Physical exam unremarkable, generalized weakness. CTH/CTA unremarkable.     Impression: Dizziness and generalized weakness, no focal sensorimotor symptoms in setting of recent and current infection, less likely acute ischemic stroke,     Recommendations  - symptomatic management with Meclizine (antihistamine) 12.5-50 mg BID (outpatient 25mg TID-QID; choice in pregnancy; max daily dose 100mg). If refractory can give Diazepam 1mg q12 hrs PO,  - For nausea, Metoclopramide 5-10mg IVP or Ondansetron 4-8mg. Check renal function and QTc.  - orthostatics vitals  - Gradual normotension (<140/90) from a neurovascular standpoint  - No further inpatient neurological workup at this time  - Can f/u with outpatient PCP    Case discussed with telestroke attending, Jennie Moran

## 2025-07-11 NOTE — ED ADULT NURSE NOTE - DISCHARGE DATE/TIME
no loss of consciousness, no gait abnormality, no headache, no sensory deficits, and no weakness.
11-Jul-2025 05:26